# Patient Record
Sex: FEMALE | Race: WHITE | NOT HISPANIC OR LATINO | Employment: OTHER | URBAN - METROPOLITAN AREA
[De-identification: names, ages, dates, MRNs, and addresses within clinical notes are randomized per-mention and may not be internally consistent; named-entity substitution may affect disease eponyms.]

---

## 2024-08-20 ENCOUNTER — APPOINTMENT (OUTPATIENT)
Dept: DERMATOLOGY | Facility: CLINIC | Age: 79
End: 2024-08-20
Payer: MEDICARE

## 2025-02-11 NOTE — PROGRESS NOTES
Subjective   Patient ID: Nicole Hargrove is a 79 y.o. female who presents for Cerumen Impaction and Hearing Screening.  HPI  This 79-year-old female last seen by one of my previous associates in February 2023 is being seen in follow-up on hearing.  According to the records reviewed from past visit she has had problems with cerumen buildup and a pressure sensation in both ears often exacerbated by airflight.  She has been noted to have a mild to moderate high-frequency sensorineural hearing loss slightly worse on the left ear when last seen.  She was felt in the past to be a borderline candidate for amplification with hearing aids.  She does have a history of cervical dystonia and has been treated by neurology with Botox injection.  She also has a history of spinal stenosis, osteoporosis hypertension, acid reflux disease.  She has had no subjective changes in hearing to report.  This been no vertigo issues but she has had periodically nasal congestive problems with drainage.  Review of Systems   A 12 point ROS  has been reviewed and are negative for complaint except for what is stated in the history of present illness and /or for past medical history as noted in the EMR.    Past Medical History:   Diagnosis Date    Personal history of other diseases of the circulatory system     History of hypertension    Personal history of other endocrine, nutritional and metabolic disease     History of high cholesterol          Current Outpatient Medications:     azithromycin (Zithromax) 250 mg tablet, use as directed BY MOUTH ONCE A DAY FOR 5 DAYS, Disp: , Rfl:     chlorthalidone (Hygroton) 25 mg tablet, Take by mouth., Disp: , Rfl:     hydroCHLOROthiazide (Microzide) 12.5 mg capsule, Take 1 capsule (12.5 mg) by mouth once daily in the morning. Take before meals., Disp: , Rfl:     losartan (Cozaar) 50 mg tablet, Take 1 tablet (50 mg) by mouth 2 times a day., Disp: , Rfl:     methylPREDNISolone (Medrol Dospak) 4 mg tablets, TAKE BY  MOUTH AS DIRECTED FOLLOWING PACKAGE INSTRUCTIONS. DO NOT TAKE NSAIDS WHILE ON THIS. DO NOT TAKE IBUPROFEN (ADVIL)  NAPROXEN (ALEVE), Disp: , Rfl:     Prolia 60 mg/mL syringe, INJECT 1 ML INTO THE SKIN EVERY 6 MONTHS, Disp: , Rfl:     atorvastatin (Lipitor) 40 mg tablet, Take 1 tablet (40 mg) by mouth once daily at bedtime., Disp: , Rfl:     calcium carbonate 600 mg calcium (1,500 mg) tablet, 600 mg., Disp: , Rfl:     cholecalciferol (Vitamin D-3) 50 mcg (2,000 unit) capsule, Take by mouth., Disp: , Rfl:     lisinopril 10 mg tablet, Take 1 tablet (10 mg) by mouth once daily., Disp: , Rfl:     LORazepam (Ativan) 0.5 mg tablet, Take 1 tablet (0.5 mg) by mouth once daily as needed., Disp: , Rfl:     nebivolol (Bystolic) 10 mg tablet, Take 1 tablet (10 mg) by mouth once daily., Disp: , Rfl:     olmesartan (BENIcar) 40 mg tablet, Take 1 tablet (40 mg) by mouth once daily., Disp: , Rfl:     pantoprazole (ProtoNix) 20 mg EC tablet, Take 1 tablet (20 mg) by mouth., Disp: , Rfl:     valACYclovir (Valtrex) 1 gram tablet, 1 tablet Orally three times a day (tid) for 7 days, Disp: , Rfl:      No past surgical history on file.     No family history on file.    Social History     Tobacco Use    Smoking status: Never    Smokeless tobacco: Never   Substance Use Topics    Alcohol use: Never       Allergies   Allergen Reactions    Meperidine (Pf) GI Upset     There were no vitals taken for this visit.    Objective   Physical Exam  EXAMINATION:    GENERAL [place].EARANCE: Alert, in no acute distress, normal pitch and clarity of voice, well-developed and nourished, cooperative.    HEAD/FACE: Normocephalic, atraumatic, normal facial movements and strength, no no tenderness to palpation, no lesions noted.    SKIN: Normal turgor, no raised or ulcerative lesions, warm and dry to palpation.    EYES: Extraocular motions intact, no nystagmus noted, pupils equal and reactive to light and accommodation, no conjunctivitis.    EARS: Both ears--external  ear anatomy is normal without lesions, auditory canals are patent and without skin abrasions or lesions, hearing is intact to voice, tympanic membranes are intact with no acute inflammation, light reflexes present, no effusions are noted and no mastoid tenderness found to palpation.    NOSE: No external skin lesions are noted, nares are patent, septum is intact, sinuses are nontender to palpation bilaterally, no intranasal lesions or inflammation is noted, nasal valve is normal.    OROPHARYNX/ORAL CAVITY: Oropharynx is not inflamed and is without lesions, mucosa of the oral cavity is intact and without lesions, tongue is midline and mobile, no acute dental disease is noted, TMJs are mobile    LUNG-- NO wheezing or rhonchi normal respiratory effort    HEART-- No venous congestion,  rate and rhythm regular,    NECK: No lymphadenopathy is palpated, carotid pulses are intact, neck is supple with full range of motion, no thyroid abnormalities are noted, trachea is midline, no neck masses are palpated.    LYMPHATICS: No cervical adenopathy or supraclavicular adenopathy is palpated.    NEUROLOGIC/PSYCH; alert and oriented, cranial nerves are grossly intact, gait is without falling, no motor deficits are noted.    Patient ID: Nicole Hargrove is a 79 y.o. female.    Ear cerumen removal    Date/Time: 2/13/2025 12:32 PM    Performed by: Chino Cartagena DMD, MD  Authorized by: Chino Cartagena DMD, MD    Consent:     Consent obtained:  Verbal    Consent given by:  Patient    Risks discussed:  Pain and incomplete removal    Alternatives discussed:  No treatment and alternative treatment  Universal protocol:     Procedure explained and questions answered to patient or proxy's satisfaction: yes      Imaging studies available: no      Required blood products, implants, devices, and special equipment available: no      Patient identity confirmed:  Verbally with patient  Procedure details:     Location:  L ear and R ear    Procedure  type: curette      Procedure type comment:  Or suction    Procedure outcomes: cerumen removed    Post-procedure details:     Inspection:  No bleeding and ear canal clear    Hearing quality:  Improved    Procedure completion:  Tolerated well, no immediate complications  Comments:      CERUMEN REMOVAL    Consent:   the planned procedure is discussed including possible risks, benefits and alternative treatments reviewed. Verbal consent is obtained.    Indications:  obstructive cerumen is noted affecting hearing and causing discomfort.    Procedure: The ears are examined microscopically and obstructive cerumen is removed with a wax loop, suction, and forceps.    Findings: Cerumen and epithelial debris obstructs both canals. Tympanic membranes are intact and noninflamed once visualized and no infections are noted.    Post procedure: The patient tolerated the procedure well without complications.    Her audiogram today in the mid upper frequencies of sound was positive for a mild to moderately severe sloping central hearing loss.  Discrimination score was 100% on the right at 50 dB 90% in the left at 50 dB.  Tympanograms were normal  Assessment/Plan   Problem List Items Addressed This Visit    None  Visit Diagnoses         Codes    Sensorineural hearing loss (SNHL) of both ears    -  Primary H90.3    Bilateral impacted cerumen     H61.23    Post-nasal drainage     R09.82             I discussed the clinical finds with the patient.  From the standpoint of her hearing there were some subtle changes in the high-frequency areas in both ears but her general hearing pattern was similar to past test.  She does have enough hearing loss to warrant hearing aid evaluation if she is motivated to pursue it and this was discussed with her.  At the present time she feels she is not prepared to do so.  Needs to stay well-hydrated, avoid excessive salt in the diet and always contact the office if there is any sudden hearing change.   Yearly recheck of hearing is advised.    I discussed the findings with the patient. Do to the history of cerumen impactions, avoidance of Q tip use and water contamination is advised. Periodic check ups in the office or with the PCP are advised and if recurrent obstructions are noted a scheduled cleaning schedule will be maintained. Ear pain, otorhea, changes in hearing should be reported to the office. For some the use of debrox or baby oil can be helpful as long as carlotta TM is intact and not perforated.    From the standpoint of nasal congestion and drainage which seems to be periodically noted use of saline nasal misting of the use of saline irrigations was discussed as a treatment.  I did provide her with a [employer] products available for her use.    This patient is advised to follow up with their PCP for all other health care issues and treatment. Dictation was done with dragon transcription and errors in spelling  and diction are possible.      Chino Cartagena DMD, MD 02/13/25 12:34 PM

## 2025-02-13 ENCOUNTER — APPOINTMENT (OUTPATIENT)
Dept: OTOLARYNGOLOGY | Facility: CLINIC | Age: 80
End: 2025-02-13
Payer: MEDICARE

## 2025-02-13 ENCOUNTER — APPOINTMENT (OUTPATIENT)
Dept: AUDIOLOGY | Facility: CLINIC | Age: 80
End: 2025-02-13
Payer: MEDICARE

## 2025-02-13 DIAGNOSIS — H90.3 SENSORINEURAL HEARING LOSS (SNHL) OF BOTH EARS: Primary | ICD-10-CM

## 2025-02-13 DIAGNOSIS — H61.23 BILATERAL IMPACTED CERUMEN: ICD-10-CM

## 2025-02-13 DIAGNOSIS — H93.19 TINNITUS, UNSPECIFIED LATERALITY: ICD-10-CM

## 2025-02-13 DIAGNOSIS — R09.82 POST-NASAL DRAINAGE: ICD-10-CM

## 2025-02-13 PROCEDURE — 99214 OFFICE O/P EST MOD 30 MIN: CPT | Performed by: OTOLARYNGOLOGY

## 2025-02-13 PROCEDURE — 92557 COMPREHENSIVE HEARING TEST: CPT | Performed by: AUDIOLOGIST

## 2025-02-13 PROCEDURE — 1160F RVW MEDS BY RX/DR IN RCRD: CPT | Performed by: OTOLARYNGOLOGY

## 2025-02-13 PROCEDURE — 69210 REMOVE IMPACTED EAR WAX UNI: CPT | Performed by: OTOLARYNGOLOGY

## 2025-02-13 PROCEDURE — 1126F AMNT PAIN NOTED NONE PRSNT: CPT | Performed by: OTOLARYNGOLOGY

## 2025-02-13 PROCEDURE — 92567 TYMPANOMETRY: CPT | Performed by: AUDIOLOGIST

## 2025-02-13 PROCEDURE — 1159F MED LIST DOCD IN RCRD: CPT | Performed by: OTOLARYNGOLOGY

## 2025-02-13 PROCEDURE — 1036F TOBACCO NON-USER: CPT | Performed by: OTOLARYNGOLOGY

## 2025-02-13 RX ORDER — DENOSUMAB 60 MG/ML
INJECTION SUBCUTANEOUS
COMMUNITY
Start: 2025-01-13 | End: 2026-01-13

## 2025-02-13 RX ORDER — AZITHROMYCIN 250 MG/1
TABLET, FILM COATED ORAL
COMMUNITY
Start: 2024-09-04

## 2025-02-13 RX ORDER — CHLORTHALIDONE 25 MG/1
TABLET ORAL
COMMUNITY
Start: 2021-03-22

## 2025-02-13 RX ORDER — LOSARTAN POTASSIUM 50 MG/1
1 TABLET ORAL 2 TIMES DAILY
COMMUNITY
Start: 2021-07-07

## 2025-02-13 RX ORDER — HYDROCHLOROTHIAZIDE 12.5 MG/1
12.5 CAPSULE ORAL
COMMUNITY
Start: 2024-11-14

## 2025-02-13 RX ORDER — VALACYCLOVIR HYDROCHLORIDE 1 G/1
TABLET, FILM COATED ORAL
COMMUNITY

## 2025-02-13 RX ORDER — ATORVASTATIN CALCIUM 40 MG/1
40 TABLET, FILM COATED ORAL NIGHTLY
COMMUNITY

## 2025-02-13 RX ORDER — LISINOPRIL 10 MG/1
10 TABLET ORAL
COMMUNITY

## 2025-02-13 RX ORDER — METHYLPREDNISOLONE 4 MG/1
TABLET ORAL
COMMUNITY
Start: 2024-07-15

## 2025-02-13 RX ORDER — ACETAMINOPHEN 500 MG
TABLET ORAL
COMMUNITY

## 2025-02-13 RX ORDER — PANTOPRAZOLE SODIUM 20 MG/1
20 TABLET, DELAYED RELEASE ORAL
COMMUNITY

## 2025-02-13 RX ORDER — OLMESARTAN MEDOXOMIL 40 MG/1
40 TABLET ORAL DAILY
COMMUNITY

## 2025-02-13 RX ORDER — LORAZEPAM 0.5 MG/1
0.5 TABLET ORAL DAILY PRN
COMMUNITY

## 2025-02-13 RX ORDER — PSYLLIUM HUSK 0.4 G
600 CAPSULE ORAL
COMMUNITY

## 2025-02-13 RX ORDER — NEBIVOLOL 10 MG/1
10 TABLET ORAL DAILY
COMMUNITY

## 2025-02-13 ASSESSMENT — COLUMBIA-SUICIDE SEVERITY RATING SCALE - C-SSRS: 1. IN THE PAST MONTH, HAVE YOU WISHED YOU WERE DEAD OR WISHED YOU COULD GO TO SLEEP AND NOT WAKE UP?: NO

## 2025-02-13 ASSESSMENT — PATIENT HEALTH QUESTIONNAIRE - PHQ9
1. LITTLE INTEREST OR PLEASURE IN DOING THINGS: NOT AT ALL
2. FEELING DOWN, DEPRESSED OR HOPELESS: NOT AT ALL
SUM OF ALL RESPONSES TO PHQ9 QUESTIONS 1 AND 2: 0

## 2025-02-13 ASSESSMENT — PAIN SCALES - GENERAL: PAINLEVEL_OUTOF10: 0-NO PAIN

## 2025-02-13 NOTE — PROGRESS NOTES
COMPREHENSIVE AUDIOMETRIC EVALUATION      Name:  Nicole Hargrove  :  1945  Age:  79 y.o.  Date of Evaluation:  25   Referring Provider:  Chino Cartagena DMD, MD     History:  Ms. Hargrove was seen today for an evaluation of hearing. Please recall that the patient has a known bilateral hearing loss.  Patient reported concerns for occasional tinnitus of unspecified laterality, decreased hearing in the left ear, and aural fullness in the left ear.  When asked, patient denied otalgia, tinnitus, and dizziness    See audiometric evaluation at end of this report or scanned under media tab    OTOSCOPY:       Right Ear: Significant though non-occluding cerumen       Left Ear: Significant though non-occluding cerumen    226 Hz TYMPANOMETRY:       Right Ear: Type As: low compliance, normal peak pressure and normal ear canal volume, which may be consistent with eustachian tube dysfunction       Left Ear: Type A: normal peak pressure, compliance, and ear canal volume, consistent with middle ear function within normal limits    AUDIOMETRIC EVALUATION (Phones):       Right Ear: Essentially Normal through 2000 Hz sloping to Severe, Sensorineural hearing loss                 Left Ear:  Essentially Normal through 1000 Hz sloping to Severe, Sensorineural hearing loss            NOTE: Hearing sensitivity  decreased in the right ear at 1839-8943 Hz and decreased in the left ear at 8000 Hz as compared to most recent audiometric evaluation  NOTE: Change in transducer from previous evaluation    Test technique:  Standard Audiometry  Reliability:   good    SPEECH RECOGNITION THRESHOLD:       Right Ear:  10 dBHL in good agreement with PTA       Left Ear:  15 dBHL in good agreement with PTA    WORD RECOGNITION:       Right Ear:  100%  excellent (%) at normal presentation level       Left Ear:   90%  excellent (%) at normal presentation level    DISCUSSION:   Discussed results and recommendations with patient.  Questions  were addressed and the patient was encouraged to contact our department should concerns arise.    RECOMMENDATIONS:  - Follow up with Chino Cartagena DMD, MD as scheduled.   -Recommend patient return for hearing aid evaluation.  -Recommend patient return for repeated audiometric evaluation should concerns for changes in hearing sensitivity arise or as medically indicated.    ELSI Palafox.  [facility] Audiology Student     Ching Krause, CCC-A     Appt: 10:00 - 10:30 AM

## 2025-04-08 ENCOUNTER — APPOINTMENT (OUTPATIENT)
Dept: AUDIOLOGY | Facility: CLINIC | Age: 80
End: 2025-04-08
Payer: MEDICARE

## 2026-03-09 ENCOUNTER — APPOINTMENT (OUTPATIENT)
Dept: OTOLARYNGOLOGY | Facility: CLINIC | Age: 81
End: 2026-03-09
Payer: MEDICARE

## 2026-03-09 ENCOUNTER — APPOINTMENT (OUTPATIENT)
Dept: AUDIOLOGY | Facility: CLINIC | Age: 81
End: 2026-03-09
Payer: MEDICARE